# Patient Record
Sex: FEMALE | Race: WHITE | NOT HISPANIC OR LATINO | Employment: FULL TIME | URBAN - METROPOLITAN AREA
[De-identification: names, ages, dates, MRNs, and addresses within clinical notes are randomized per-mention and may not be internally consistent; named-entity substitution may affect disease eponyms.]

---

## 2019-04-10 LAB — RAPID GROUP A STREP (OHS): NEGATIVE

## 2020-01-21 ENCOUNTER — HISTORICAL (OUTPATIENT)
Dept: ADMINISTRATIVE | Facility: HOSPITAL | Age: 30
End: 2020-01-21

## 2020-01-21 LAB
ABS NEUT (OLG): 5.2 X10(3)/MCL (ref 2.1–9.2)
ALBUMIN SERPL-MCNC: 4.4 GM/DL (ref 3.4–5)
ALBUMIN/GLOB SERPL: 1.83 {RATIO} (ref 1.5–2.5)
ALP SERPL-CCNC: 60 UNIT/L (ref 38–126)
ALT SERPL-CCNC: 10 UNIT/L (ref 7–52)
APPEARANCE, UA: CLEAR
AST SERPL-CCNC: 14 UNIT/L (ref 15–37)
BACTERIA #/AREA URNS AUTO: NORMAL /HPF
BILIRUB SERPL-MCNC: 0.4 MG/DL (ref 0.2–1)
BILIRUB UR QL STRIP: NEGATIVE MG/DL
BILIRUBIN DIRECT+TOT PNL SERPL-MCNC: 0.1 MG/DL (ref 0–0.5)
BILIRUBIN DIRECT+TOT PNL SERPL-MCNC: 0.3 MG/DL
BUN SERPL-MCNC: 21 MG/DL (ref 7–18)
CALCIUM SERPL-MCNC: 9 MG/DL (ref 8.5–10)
CHLORIDE SERPL-SCNC: 100 MMOL/L (ref 98–107)
CO2 SERPL-SCNC: 29 MMOL/L (ref 21–32)
COLOR UR: NORMAL
CREAT SERPL-MCNC: 0.9 MG/DL (ref 0.6–1.3)
ERYTHROCYTE [DISTWIDTH] IN BLOOD BY AUTOMATED COUNT: 11.3 % (ref 11.5–17)
GLOBULIN SER-MCNC: 2.4 GM/DL (ref 1.2–3)
GLUCOSE (UA): NEGATIVE MG/DL
GLUCOSE SERPL-MCNC: 98 MG/DL (ref 74–106)
HCT VFR BLD AUTO: 39.6 % (ref 37–47)
HGB BLD-MCNC: 13.8 GM/DL (ref 12–16)
HGB UR QL STRIP: NEGATIVE UNIT/L
KETONES UR QL STRIP: NEGATIVE MG/DL
LEUKOCYTE ESTERASE UR QL STRIP: NEGATIVE UNIT/L
LYMPHOCYTES # BLD AUTO: 2.3 X10(3)/MCL (ref 0.6–3.4)
LYMPHOCYTES NFR BLD AUTO: 28.3 % (ref 13–40)
MCH RBC QN AUTO: 31.4 PG (ref 27–31.2)
MCHC RBC AUTO-ENTMCNC: 35 GM/DL (ref 32–36)
MCV RBC AUTO: 90 FL (ref 80–94)
MONOCYTES # BLD AUTO: 0.8 X10(3)/MCL (ref 0.1–1.3)
MONOCYTES NFR BLD AUTO: 9.3 % (ref 0.1–24)
NEUTROPHILS NFR BLD AUTO: 62.4 % (ref 47–80)
NITRITE UR QL STRIP.AUTO: NEGATIVE
PH UR STRIP: 7 [PH]
PLATELET # BLD AUTO: 218 X10(3)/MCL (ref 130–400)
PMV BLD AUTO: 9 FL (ref 9.4–12.4)
POTASSIUM SERPL-SCNC: 4.3 MMOL/L (ref 3.5–5.1)
PROT SERPL-MCNC: 6.8 GM/DL (ref 6.4–8.2)
PROT UR QL STRIP: NEGATIVE MG/DL
RBC # BLD AUTO: 4.39 X10(6)/MCL (ref 4.2–5.4)
RBC #/AREA URNS HPF: NORMAL /HPF
SODIUM SERPL-SCNC: 135 MMOL/L (ref 136–145)
SP GR UR STRIP: 1.02
SQUAMOUS EPITHELIAL, UA: NORMAL /LPF
TSH SERPL-ACNC: 0.66 MIU/ML (ref 0.35–4.94)
UROBILINOGEN UR STRIP-ACNC: 0.2 MG/DL
WBC # SPEC AUTO: 8.3 X10(3)/MCL (ref 4.5–11.5)
WBC #/AREA URNS AUTO: NORMAL /[HPF]

## 2020-03-19 ENCOUNTER — HISTORICAL (OUTPATIENT)
Dept: ADMINISTRATIVE | Facility: HOSPITAL | Age: 30
End: 2020-03-19

## 2020-03-19 ENCOUNTER — HISTORICAL (OUTPATIENT)
Dept: LAB | Facility: HOSPITAL | Age: 30
End: 2020-03-19

## 2020-03-19 LAB
APTT PPP: 28 SECOND(S) (ref 23.2–33.7)
CHOLEST SERPL-MCNC: 205 MG/DL (ref 0–200)
CHOLEST/HDLC SERPL: 3.5 {RATIO}
DEPRECATED CALCIDIOL+CALCIFEROL SERPL-MC: 25 NG/ML (ref 30–80)
HDLC SERPL-MCNC: 58 MG/DL (ref 35–60)
INR PPP: 1.1 (ref 0–1.3)
LDLC SERPL CALC-MCNC: 140 MG/DL (ref 0–129)
PROTHROMBIN TIME: 13.7 SECOND(S) (ref 11.1–13.7)
TRIGL SERPL-MCNC: 84 MG/DL (ref 30–150)
VLDLC SERPL CALC-MCNC: 16.8 MG/DL

## 2020-05-14 ENCOUNTER — HISTORICAL (OUTPATIENT)
Dept: LAB | Facility: HOSPITAL | Age: 30
End: 2020-05-14

## 2020-08-18 ENCOUNTER — HISTORICAL (OUTPATIENT)
Dept: ADMINISTRATIVE | Facility: HOSPITAL | Age: 30
End: 2020-08-18

## 2020-08-18 LAB
ABS NEUT (OLG): 4.6 X10(3)/MCL (ref 2.1–9.2)
ALBUMIN SERPL-MCNC: 4.4 GM/DL (ref 3.4–5)
ALBUMIN/GLOB SERPL: 1.91 {RATIO} (ref 1.5–2.5)
ALP SERPL-CCNC: 75 UNIT/L (ref 38–126)
ALT SERPL-CCNC: 8 UNIT/L (ref 7–52)
AST SERPL-CCNC: 13 UNIT/L (ref 15–37)
BILIRUB SERPL-MCNC: 0.2 MG/DL (ref 0.2–1)
BILIRUBIN DIRECT+TOT PNL SERPL-MCNC: 0.1 MG/DL
BILIRUBIN DIRECT+TOT PNL SERPL-MCNC: 0.1 MG/DL (ref 0–0.5)
BUN SERPL-MCNC: 15 MG/DL (ref 7–18)
CALCIUM SERPL-MCNC: 8.9 MG/DL (ref 8.5–10)
CHLORIDE SERPL-SCNC: 102 MMOL/L (ref 98–107)
CO2 SERPL-SCNC: 28 MMOL/L (ref 21–32)
CREAT SERPL-MCNC: 0.81 MG/DL (ref 0.6–1.3)
ERYTHROCYTE [DISTWIDTH] IN BLOOD BY AUTOMATED COUNT: 11.4 % (ref 11.5–17)
GLOBULIN SER-MCNC: 2.3 GM/DL (ref 1.2–3)
GLUCOSE SERPL-MCNC: 77 MG/DL (ref 74–106)
HCT VFR BLD AUTO: 38 % (ref 37–47)
HGB BLD-MCNC: 12.9 GM/DL (ref 12–16)
LYMPHOCYTES # BLD AUTO: 2.8 X10(3)/MCL (ref 0.6–3.4)
LYMPHOCYTES NFR BLD AUTO: 36.9 % (ref 13–40)
MCH RBC QN AUTO: 31.4 PG (ref 27–31.2)
MCHC RBC AUTO-ENTMCNC: 34 GM/DL (ref 32–36)
MCV RBC AUTO: 92 FL (ref 80–94)
MONOCYTES # BLD AUTO: 0.3 X10(3)/MCL (ref 0.1–1.3)
MONOCYTES NFR BLD AUTO: 4.4 % (ref 0.1–24)
NEUTROPHILS NFR BLD AUTO: 58.7 % (ref 47–80)
PLATELET # BLD AUTO: 240 X10(3)/MCL (ref 130–400)
PMV BLD AUTO: 8.5 FL (ref 9.4–12.4)
POTASSIUM SERPL-SCNC: 4.1 MMOL/L (ref 3.5–5.1)
PROT SERPL-MCNC: 6.7 GM/DL (ref 6.4–8.2)
RBC # BLD AUTO: 4.11 X10(6)/MCL (ref 4.2–5.4)
SODIUM SERPL-SCNC: 137 MMOL/L (ref 136–145)
TSH SERPL-ACNC: 0.73 MIU/ML (ref 0.35–4.94)
WBC # SPEC AUTO: 7.7 X10(3)/MCL (ref 4.5–11.5)

## 2020-11-02 ENCOUNTER — HISTORICAL (OUTPATIENT)
Dept: LAB | Facility: HOSPITAL | Age: 30
End: 2020-11-02

## 2020-11-27 LAB — RAPID GROUP A STREP (OHS): NEGATIVE

## 2021-05-06 ENCOUNTER — HISTORICAL (OUTPATIENT)
Dept: ADMINISTRATIVE | Facility: HOSPITAL | Age: 31
End: 2021-05-06

## 2021-05-06 LAB
ABS NEUT (OLG): 4.3 X10(3)/MCL (ref 2.1–9.2)
ALBUMIN SERPL-MCNC: 4.3 GM/DL (ref 3.4–5)
ALBUMIN/GLOB SERPL: 1.65 {RATIO} (ref 1.5–2.5)
ALP SERPL-CCNC: 63 UNIT/L (ref 38–126)
ALT SERPL-CCNC: 12 UNIT/L (ref 7–52)
AST SERPL-CCNC: 14 UNIT/L (ref 15–37)
BILIRUB SERPL-MCNC: 0.3 MG/DL (ref 0.2–1)
BILIRUBIN DIRECT+TOT PNL SERPL-MCNC: 0.1 MG/DL (ref 0–0.5)
BILIRUBIN DIRECT+TOT PNL SERPL-MCNC: 0.2 MG/DL
BUN SERPL-MCNC: 12 MG/DL (ref 7–18)
CALCIUM SERPL-MCNC: 8.6 MG/DL (ref 8.5–10)
CHLORIDE SERPL-SCNC: 102 MMOL/L (ref 98–107)
CHOLEST SERPL-MCNC: 241 MG/DL (ref 0–200)
CHOLEST/HDLC SERPL: 4.2 {RATIO}
CO2 SERPL-SCNC: 27 MMOL/L (ref 21–32)
CREAT SERPL-MCNC: 0.69 MG/DL (ref 0.6–1.3)
ERYTHROCYTE [DISTWIDTH] IN BLOOD BY AUTOMATED COUNT: 11.4 % (ref 11.5–17)
EST CREAT CLEARANCE SER (OHS): 179.75 ML/MIN
GLOBULIN SER-MCNC: 2.6 GM/DL (ref 1.2–3)
GLUCOSE SERPL-MCNC: 86 MG/DL (ref 74–106)
HCT VFR BLD AUTO: 36.8 % (ref 37–47)
HDLC SERPL-MCNC: 58 MG/DL (ref 35–60)
HGB BLD-MCNC: 13 GM/DL (ref 12–16)
LDLC SERPL CALC-MCNC: 164 MG/DL (ref 0–129)
LYMPHOCYTES # BLD AUTO: 2.9 X10(3)/MCL (ref 0.6–3.4)
LYMPHOCYTES NFR BLD AUTO: 38.7 % (ref 13–40)
MCH RBC QN AUTO: 32.9 PG (ref 27–31.2)
MCHC RBC AUTO-ENTMCNC: 35 GM/DL (ref 32–36)
MCV RBC AUTO: 93 FL (ref 80–94)
MONOCYTES # BLD AUTO: 0.3 X10(3)/MCL (ref 0.1–1.3)
MONOCYTES NFR BLD AUTO: 3.8 % (ref 0.1–24)
NEUTROPHILS NFR BLD AUTO: 57.5 % (ref 47–80)
PLATELET # BLD AUTO: 262 X10(3)/MCL (ref 130–400)
PMV BLD AUTO: 8.5 FL (ref 9.4–12.4)
POTASSIUM SERPL-SCNC: 4.1 MMOL/L (ref 3.5–5.1)
PROT SERPL-MCNC: 6.9 GM/DL (ref 6.4–8.2)
RBC # BLD AUTO: 3.95 X10(6)/MCL (ref 4.2–5.4)
SODIUM SERPL-SCNC: 137 MMOL/L (ref 136–145)
TRIGL SERPL-MCNC: 154 MG/DL (ref 30–150)
TSH SERPL-ACNC: 0.85 MIU/ML (ref 0.35–4.94)
VLDLC SERPL CALC-MCNC: 30.8 MG/DL
WBC # SPEC AUTO: 7.5 X10(3)/MCL (ref 4.5–11.5)

## 2022-01-06 ENCOUNTER — HISTORICAL (OUTPATIENT)
Dept: LAB | Facility: HOSPITAL | Age: 32
End: 2022-01-06

## 2022-01-06 LAB — SARS-COV-2 AG RESP QL IA.RAPID: NEGATIVE

## 2022-01-07 ENCOUNTER — HISTORICAL (OUTPATIENT)
Dept: LAB | Facility: HOSPITAL | Age: 32
End: 2022-01-07

## 2022-01-07 LAB — SARS-COV-2 AG RESP QL IA.RAPID: POSITIVE

## 2022-02-03 ENCOUNTER — HISTORICAL (OUTPATIENT)
Dept: LAB | Facility: HOSPITAL | Age: 32
End: 2022-02-03

## 2022-02-03 LAB
FLUAV AG UPPER RESP QL IA.RAPID: NEGATIVE
FLUBV AG UPPER RESP QL IA.RAPID: NEGATIVE
SARS-COV-2 RNA RESP QL NAA+PROBE: POSITIVE

## 2022-04-09 ENCOUNTER — HISTORICAL (OUTPATIENT)
Dept: ADMINISTRATIVE | Facility: HOSPITAL | Age: 32
End: 2022-04-09

## 2022-04-29 VITALS
HEIGHT: 65 IN | SYSTOLIC BLOOD PRESSURE: 112 MMHG | OXYGEN SATURATION: 99 % | BODY MASS INDEX: 34.46 KG/M2 | HEIGHT: 65 IN | DIASTOLIC BLOOD PRESSURE: 76 MMHG | DIASTOLIC BLOOD PRESSURE: 82 MMHG | WEIGHT: 206.81 LBS | SYSTOLIC BLOOD PRESSURE: 118 MMHG | OXYGEN SATURATION: 96 % | BODY MASS INDEX: 31.51 KG/M2 | WEIGHT: 189.13 LBS

## 2022-05-02 NOTE — HISTORICAL [facility] CERNER
This is a historical note converted from Dionisio. Formatting and pictures may have been removed.  Please reference Dionisio for original formatting and attached multimedia. Chief Complaint  NP FATIGUE, COLD, ANXIETY  NOW HAS SORE THROAT, HEADACHE, CONGESTION  History of Present Illness  Fatigue over the last month.? Associated with cold sensitivity.  Hx of mild anxiety since childhood.? 6 years ago pts grandfather  which triggered significant increase in anxiety.? Pt reports chronic overwhelming anxiety since.? Frequent panic attacks.??Affecting quality of life, marriage,?parenting. ?Patient previously?treated with fluoxetine?and BuSpar as needed which was not overly effective.  2-day history of sore throat, nasal congestion, rhinorrhea?and postnasal drip. ?Objective fever?today.  Review of Systems  Constitutional:?No weight loss, no fever, fatigue, no chills, no night sweats,?no weakness  Eyes:?No blurred vision,?no redness,?no drainage,?no ocular?pain  HEENT:?sore throat,?no ear pain, no sinus pressure, nasal congestion, rhinorrhea, postnasal drip  Respiratory:?No cough, no wheezing, no sputum production, no shortness of breath  Cardiovascular:?No chest pain, no palpitations, no dyspnea on exertion,?no orthopnea  Gastrointestinal:?No nausea, no vomiting, no abdominal pain, no diarrhea,?no constipation, no melena,?no hematochezia  Genitourinary:?No dysuria, no hematuria, no frequency, no urgency, no incontinence, no discharge  Musculoskeletal:?No myalgias, no arthralgias, no weakness, no joint effusion, no edema  Integumentary:?No rashes, no hives, no itching, no lesions, no jaundice  Neurologic:?No headaches, no numbness, no tingling, no weakness, no dizziness  Psychiatric:?anxiety, no irritability, no depression,?no suicidal ideations, no?homicidal ideations,?no delusions, no hallucinations  Endocrine:?No polyuria, no polydipsia, no polyphagia  Hematology:?No bruising, no lymphadenopathy,?no  paleness  ?  Physical Exam  Vitals & Measurements  T:?37.1? ?C (Oral)? HR:?90(Peripheral)? BP:?100/72?  HT:?165?cm? WT:?77.6?kg? BMI:?28.5?  General:?Well developed, Well-nourished, in No acute distress, A&O x 4  Eye:?PERRLA, EOMI, Clear conjunctiva, Eyelids unremarkable  Ears:?Bilateral EAC clear?and?Bilateral TM clear  Nose:? Mucosa edematous, rhinorrhea, No lesions  O/P:??erythema,?No tonsillar hypertrophy,?No tonsillar exudates,?cobblestoning  Neck:?Soft, Nontender, No thyromegaly, Full range of motion, No cervical lymphadenopathy, No JVD  Cardiovascular:?Regular rate and rhythm, No murmurs, No gallops, No rubs  Respiratory:?Clear to auscultation bilaterally, No wheezes, No rhonchi, No?crackles  Abdomen:? Soft, Nontender, No hepatosplenomegaly, Normal and equal bowel sounds, No masses, No rebound, No guarding  Musculoskeletal:? No tenderness, FROM, No joint abnormality, No clubbing, No cyanosis,No?edema  Neurologic:? No motor or sensory deficits, Reflexes 2+ throughout, CN II-XII grossly intact  Integumentary:?No rashes or skin lesions  Psychiatric:?Good insight,?appropriate thought process, normal affect,?appropriate?speech,  non-suicidal, cooperative, appropriate judgment  Assessment/Plan  1.?Anxiety?F41.9  Start Lexapro 5 mg daily  Coping skills, stress management and treatment options discussed at length  Ordered:  Clinic Follow up, *Est. 02/21/20 3:00:00 CST, Order for future visit, Anxiety  Fatigue  Cold sensitivity  Acute URI, [facility]  Office/Outpatient Visit Level 3 New [id number] PC, Anxiety  Fatigue  Cold sensitivity  Acute URI, [facility], 01/21/20 16:08:00 CST  ?  2.?Fatigue?R53.83  Ordered:  CBC w/ Auto Diff, Routine collect, 01/21/20 16:07:00 CST, Blood, Order for future visit, Stop date 01/21/20 16:07:00 CST, Lab Collect, Fatigue, 01/21/20 16:07:00 CST  Clinic Follow up, *Est. 02/21/20 3:00:00 CST, Order for future visit, Anxiety  Fatigue  Cold sensitivity  Acute URI, [facility]  Comprehensive Metabolic Panel, Routine collect, 01/21/20 16:07:00 CST, Blood, Order for future visit, Stop date 01/21/20 16:07:00 CST, Lab Collect, Fatigue, 01/21/20 16:07:00 CST  Office/Outpatient Visit Level 3 New [id number] PC, Anxiety  Fatigue  Cold sensitivity  Acute URI, [facility], 01/21/20 16:08:00 CST  Thyroid Stimulating Hormone, Routine collect, 01/21/20 16:07:00 CST, Blood, Order for future visit, Stop date 01/21/20 16:07:00 CST, Lab Collect, Fatigue, 01/21/20 16:07:00 CST  Urinalysis Complete no reflex, Routine collect, Urine, Order for future visit, 01/21/20 16:08:00 CST, Stop date 01/21/20 16:08:00 CST, Nurse collect, Fatigue  ?  3.?Cold sensitivity?R68.89  ?CBC, CMP, TSH today  Ordered:  Clinic Follow up, *Est. 02/21/20 3:00:00 CST, Order for future visit, Anxiety  Fatigue  Cold sensitivity  Acute URI, [facility]  Office/Outpatient Visit Level 3 New [id number] PC, Anxiety  Fatigue  Cold sensitivity  Acute URI, [facility], 01/21/20 16:08:00 CST  ?  4.?Acute URI?J06.9  ?Symptomatic treatment recommended  If symptoms fail to improve patient will contact my office  Ordered:  Clinic Follow up, *Est. 02/21/20 3:00:00 CST, Order for future visit, Anxiety  Fatigue  Cold sensitivity  Acute URI, [facility]  Office/Outpatient Visit Level 3 New [id number] PC, Anxiety  Fatigue  Cold sensitivity  Acute URI, [facility], 01/21/20 16:08:00 CST  ?  Orders:  Lab Collection Request, 01/21/20 16:07:00 CST, [facility], 01/21/20 16:07:00 CST  Referrals  Clinic Follow up, *Est. 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 Exercise type: Yoga., 01/21/2020  Tobacco  Never (less than 100 in lifetime), N/A, 01/21/2020  Never (less than 100 in lifetime), N/A, 04/10/2019  Family History  Hypertension: Father.  Thyroid disorder: Mother.  Immunizations  Vaccine Date Status   influenza virus vaccine, inactivated 11/15/2017 Recorded   Health Maintenance  Health Maintenance  ???Pending?(in the next year)  ??? ??Due?  ??? ? ? ?Alcohol Misuse Screening due??01/01/20??and every 1??year(s)  ??? ? ? ?ADL Screening due??01/21/20??and every 1??year(s)  ??? ? ? ?Cervical Cancer Screening due??01/21/20??and every?  ??? ? ? ?Depression Screening due??01/21/20??and every?  ??? ? ? ?Tetanus Vaccine due??01/21/20??and every 10??year(s)  ??? ??Due In Future?  ??? ? ? ?TB Skin Test not due until??03/11/20??and every 1??year(s)  ??? ? ? ?Obesity Screening not due until??01/01/21??and every 1??year(s)  ??? ? ? ?Blood Pressure Screening not due until??01/20/21??and every 1??year(s)  ??? ? ? ?Body Mass Index Check not due until??01/20/21??and every 1??year(s)  ???Satisfied?(in the past 1 year)  ??? ??Satisfied?  ??? ? ? ?Blood Pressure Screening on??01/21/20.??Satisfied by Maite Hart LPN  ??? ? ? ?Body Mass Index Check on??01/21/20.??Satisfied by Maite Hart LPN  ??? ? ? ?Obesity Screening on??01/21/20.??Satisfied by Maite Hart LPN  ??? ? ? ?TB Skin Test on??03/11/19.  ?

## 2022-05-02 NOTE — HISTORICAL [facility] CERNER
This is a historical note converted from Dionisio. Formatting and pictures may have been removed.  Please reference Dionisio for original formatting and attached multimedia. Chief Complaint  CPX FASTING  History of Present Illness  30?year old female presents for wellness visit.  Blood Pressure - 112/82  BMI - 34.45  Immunizations -?Up to date  Breast Cancer Screening - up to date, Sees Dr. Finn Mclean  Cervical Cancer Screening -?up to date, Sees Dr. Finn Mclean  Diet - Average  Exercise - walking daily and yoga 3 times weekly  Anxiety?increased?although?mostly manageable. ?Tolerating Lexapro well.? Patient has?gone through?a separation and pending divorce over the last year.  Significant weight gain?greater than 30 pounds over the last?6 months.? Patient denies any significant?diet or lifestyle changes.  Review of Systems  Constitutional:?No weight loss, no fever, no fatigue, no chills, no night sweats,?no weakness  Eyes:?No blurred vision,?no redness,?no drainage,?no ocular?pain  HEENT:?No sore throat,?no ear pain, no sinus pressure, no nasal congestion, no rhinorrhea, no postnasal drip  Respiratory:?No cough, no wheezing, no sputum production, no shortness of breath  Cardiovascular:?No chest pain, no palpitations, no dyspnea on exertion,?no orthopnea  Gastrointestinal:?No nausea, no vomiting, no abdominal pain, no diarrhea,?no constipation, no melena,?no hematochezia  Genitourinary:?No dysuria, no hematuria, no frequency, no urgency, no incontinence, no discharge  Musculoskeletal:?No myalgias, no arthralgias, no weakness, no joint effusion, no edema  Integumentary:?No rashes, no hives, no itching, no lesions, no jaundice  Neurologic:?No headaches, no numbness, no tingling, no weakness, no dizziness  Psychiatric:?anxiety, no irritability, no depression,?no suicidal ideations, no?homicidal ideations,?no delusions, no hallucinations  Endocrine:?No polyuria, no polydipsia, no polyphagia  Hematology:?No  bruising, no lymphadenopathy,?no paleness  Physical Exam  Vitals & Measurements  HR:?78(Peripheral)? BP:?112/82? SpO2:?99%?  HT:?165.00?cm? WT:?93.800?kg? BMI:?34.45?  General:?Well developed, Well-nourished, in No acute distress, A&O x 4  Eye:?PERRLA, EOMI, Clear conjunctiva, Eyelids unremarkable  Ears:?Bilateral EAC clear?and?Bilateral TM clear  Nose:? Mucosa normal, No rhinorrhea, No lesions  O/P:??No?erythema,?No tonsillar hypertrophy,?No tonsillar exudates,?No cobblestoning  Neck:?Soft, Nontender, No thyromegaly, Full range of motion, No cervical lymphadenopathy, No JVD  Cardiovascular:?Regular rate and rhythm, No murmurs, No gallops, No rubs  Respiratory:?Clear to auscultation bilaterally, No wheezes, No rhonchi, No?crackles  Abdomen:? Soft, Nontender, No hepatosplenomegaly, Normal and equal bowel sounds, No masses, No rebound, No guarding  Musculoskeletal:? No tenderness, FROM, No joint abnormality, No clubbing, No cyanosis,No?edema  Neurologic:? No motor or sensory deficits, Reflexes 2+ throughout, CN II-XII grossly intact  Integumentary:?No rashes or skin lesions  Assessment/Plan  1.?Wellness examination?Z00.00  ?Discussed the?importance of maintaining a balanced diet and regular exercise  CBC, CMP, FLP, TSH today  Ordered:  Clinic Follow up, *Est. 06/09/21 9:45:00 CDT, Order for future visit, Wellness examination, ink [facility]  Comprehensive Metabolic Panel, Routine collect, 05/06/21 11:26:00 CDT, Blood, Stop date 05/06/21 11:26:00 CDT, Lab Collect, Wellness examination, 05/06/21 11:26:00 CDT  Lipid Panel, Routine collect, 05/06/21 11:26:00 CDT, Blood, Stop date 05/06/21 11:26:00 CDT, Lab Collect, Wellness examination, 05/06/21 11:26:00 CDT  Preventative Health Care Est 18-39 years [id number] PC, Wellness examination, [facility], 05/06/21 11:12:00 CDT  Thyroid Stimulating Hormone, Routine collect, 05/06/21 11:26:00 CDT, Blood, Stop date 05/06/21 11:26:00 CDT, Lab Collect, Wellness examination, 05/06/21  11:26:00 CDT  ?  2.?Anxiety?F41.9  ?Increase Lexapro to 20 mg daily  Ordered:  Clinic Follow up, *Est. 06/09/21 9:45:00 CDT, Order for future visit, Wellness examination, [facility]  ?  3.?Obesity?E66.9  ?Following review of labs will consider?options to?assist patient with weight loss  ?  Orders:  escitalopram, 20 mg = 1 tab(s), Oral, Daily, # 30 tab(s), 5 Refill(s), Pharmacy: [facility]/pharmacy #[id number], 165, cm, Height/Length Dosing, 05/06/21 10:39:00 CDT, 93.8, kg, Weight Dosing, 05/06/21 10:39:00 CDT  Referrals  Clinic Follow up, *Est. 06/09/21 9:45:00 CDT, Order for future visit, Wellness examination, [facility]   Problem List/Past Medical History  Ongoing  Anxiety  Obesity  Historical  No qualifying data  Procedure/Surgical History  right knee repair x 3 (2008)   Medications  APPLE CIDAR VINEGAR GUMMIE  escitalopram 20 mg oral tablet, 20 mg= 1 tab(s), Oral, Daily, 5 refills  ethinyl estradiol-norgestimate triphasic 35 mcg oral tablet, 1 tab(s), Oral, Daily  Allergies  codeine?(Intolerance)  Social History  Abuse/Neglect  No, 05/06/2021  Alcohol  Current, Beer, Wine, 1-2 times per week, Alcohol use interferes with work or home: No. Others hurt by drinking: No. 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 Exercise type: Yoga., 01/21/2020  Tobacco  Never (less than 100 in lifetime), No, 05/06/2021  Family History  Hypertension: Father.  Hypothyroidism.: Mother.  Ovarian cancer: Maternal Grandmother.  Thyroid cancer: Paternal Grandmother.  Brother: History is negative  Sister: History is negative  Immunizations  Vaccine Date Status   influenza virus vaccine, inactivated 2019 Recorded   influenza virus vaccine, inactivated 11/15/2017 Recorded   tetanus/diphtheria/pertussis, acel(Tdap) 2017 Recorded   Health Maintenance  Health Maintenance  ???Pending?(in the next year)  ??? ??OverDue  ??? ? ? ?TB Skin  Test due??03/11/20??and every 1??year(s)  ??? ? ? ?Influenza Vaccine due??10/01/20??and every 1??day(s)  ??? ? ? ?Alcohol Misuse Screening due??01/02/21??and every 1??year(s)  ??? ??Due?  ??? ? ? ?ADL Screening due??05/06/21??and every 1??year(s)  ??? ? ? ?Depression Screening due??05/06/21??Unknown Frequency  ??? ??Due In Future?  ??? ? ? ?Obesity Screening not due until??01/01/22??and every 1??year(s)  ???Satisfied?(in the past 1 year)  ??? ??Satisfied?  ??? ? ? ?Blood Pressure Screening on??05/06/21.??Satisfied by Maite Hart LPN  ??? ? ? ?Body Mass Index Check on??05/06/21.??Satisfied by Maite Hart LPN  ??? ? ? ?Cervical Cancer Screening on??01/04/21.??Satisfied by Arian Altamirano  ??? ? ? ?Obesity Screening on??05/06/21.??Satisfied by Maite Hart LPN  ?

## 2022-05-02 NOTE — HISTORICAL [facility] CERNER
This is a historical note converted from Cercarlos. Formatting and pictures may have been removed.  Please reference Dionisio for original formatting and attached multimedia. Chief Complaint  DIZZINESS, ROOM SPINNING. EARS FEEL FULL  History of Present Illness  Thursday last week patient noted?positional vertigo?which slowly increased since.? Yesterday noted?shortness of breath which felt like chest?tightness. ?Denies any cough,?wheezing, fever, chills.? At times she feels off balance or?as if the room is spinning. ?Symptoms worsen with positional changes.? Both ears feel?congested. ?Denies any decreased hearing.? Denies similar symptoms in the past.  Denies any recent sick contacts.? Very minimal nasal congestion.  Review of Systems  Constitutional:?No weight loss, no fever, no fatigue, no chills, no night sweats,?no weakness  Eyes:?No blurred vision,?no redness,?no drainage,?no ocular?pain  HEENT:?No sore throat,?no ear pain, no sinus pressure, nasal congestion, no rhinorrhea, no postnasal drip  Respiratory:?No cough, no wheezing, no sputum production, shortness of breath  Cardiovascular:?No chest pain, no palpitations, no dyspnea on exertion,?no orthopnea  Gastrointestinal:?No nausea, no vomiting, no abdominal pain, no diarrhea,?no constipation, no melena,?no hematochezia  Genitourinary:?No dysuria, no hematuria, no frequency, no urgency, no incontinence, no discharge  Musculoskeletal:?No myalgias, no arthralgias, no weakness, no joint effusion, no edema  Integumentary:?No rashes, no hives, no itching, no lesions, no jaundice  Neurologic:?No headaches, no numbness, no tingling, no weakness, dizziness  Psychiatric:?No anxiety, no irritability, no depression,?no suicidal ideations, no?homicidal ideations,?no delusions, no hallucinations  Endocrine:?No polyuria, no polydipsia, no polyphagia  Hematology:?No bruising, no lymphadenopathy,?no paleness  ?  Physical Exam  Vitals & Measurements  HR:?80(Peripheral)? BP:?118/76?  SpO2:?96%?  HT:?165.00?cm? WT:?85.800?kg? BMI:?31.52?  General:?Well developed, Well-nourished, in No acute distress, A&O x 4  Eye:?PERRLA, EOMI, Clear conjunctiva, Eyelids unremarkable  Ears:?Bilateral EAC clear?and?Bilateral TM clear  Nose:? Mucosa normal, No rhinorrhea, No lesions  O/P:??No?erythema,?No tonsillar hypertrophy,?No tonsillar exudates,?No cobblestoning  Neck:?Soft, Nontender, No thyromegaly, Full range of motion, No cervical lymphadenopathy, No JVD  Cardiovascular:?Regular rate and rhythm, No murmurs, No gallops, No rubs  Respiratory:?Clear to auscultation bilaterally, No wheezes, No rhonchi, No?crackles  Abdomen:? Soft, Nontender, No hepatosplenomegaly, Normal and equal bowel sounds, No masses, No rebound, No guarding  Musculoskeletal:? No tenderness, FROM, No joint abnormality, No clubbing, No cyanosis,No?edema  Neurologic:? No motor or sensory deficits, Reflexes 2+ throughout, CN II-XII grossly intact,?mildly positive Kinjal-Hallpike mostly toward the right  Integumentary:?No rashes or skin lesions  ?  Assessment/Plan  1.?SOB (shortness of breath)?R06.02  ?EKG-normal sinus rhythm without any ST or T wave changes  Chest x-ray-no consolidation?noted  ER precautions given to patient if?shortness of breath progressively worsens?or new symptoms develop overnight  Ordered:  Comprehensive Metabolic Panel, Routine collect, 08/18/20 16:39:00 CDT, Blood, Stop date 08/18/20 16:39:00 CDT, Lab Collect, SOB (shortness of breath), 08/18/20 16:39:00 CDT  Office/Outpatient Visit Level 4 Established [id number] PC, SOB (shortness of breath)  Vestibular neuronitis, [facility] - [facility], 08/18/20 16:19:00 CDT  Thyroid Stimulating Hormone, Routine collect, 08/18/20 16:39:00 CDT, Blood, Stop date 08/18/20 16:39:00 CDT, Lab Collect, SOB (shortness of breath), 08/18/20 16:39:00 CDT  XR Chest 2 Views, Routine, 08/18/20 16:19:00 CDT, Other (please specify), None, Ambulatory, Rad Type, SOB (shortness of breath), [facility]  Physicians, 08/18/20 16:19:00 CDT  ?  2.?Vestibular neuronitis?H81.20  ?  ?Celestone 12 mg IM today  Meclizine 25 mg 3 times daily  CBC, CMP, TSH today  Patient to call with update in the morning  Ordered:  Office/Outpatient Visit Level 4 Established [id number] PC, SOB (shortness of breath)  Vestibular neuronitis, [facility] - [facility], 08/18/20 16:19:00 CDT  ?  Orders:  meclizine, 25 mg = 1 tab(s), Oral, TID, PRN PRN for dizziness, X 10 day(s), # 30 tab(s), 0 Refill(s), Pharmacy: [facility]/pharmacy #[id number], 165, cm, Height/Length Dosing, 08/18/20 15:10:00 CDT, 85.8, kg, Weight Dosing, 08/18/20 15:10:00 CDT   Problem List/Past Medical History  Ongoing  Anxiety  Obesity  Historical  No qualifying data  Procedure/Surgical History  right knee repair x 3 (2008)   Medications  APPLE CIDAR VINEGAR GUMMIE  meclizine 25 mg oral tablet, 25 mg= 1 tab(s), Oral, TID, PRN  sertraline 50 mg oral tablet, See Instructions  Allergies  codeine  Social History  Abuse/Neglect  No, 08/18/2020  No, 08/17/2020  Alcohol  Current, Beer, Wine, 1-2 times per week, Alcohol use interferes with work or home: No. Others hurt by drinking: No. 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 Exercise type: Yoga., 01/21/2020  Tobacco  Never (less than 100 in lifetime), No, 08/18/2020  Never (less than 100 in lifetime), N/A, 03/19/2020  Family History  Hypertension: Father.  Hypothyroidism.: Mother.  Ovarian cancer: Maternal Grandmother.  Thyroid cancer: Paternal Grandmother.  Brother: History is negative  Sister: History is negative  Immunizations  Vaccine Date Status   influenza virus vaccine, inactivated 2019 Recorded   influenza virus vaccine, inactivated 11/15/2017 Recorded   tetanus/diphtheria/pertussis, acel(Tdap) 2017 Recorded   Health Maintenance  Health Maintenance  ???Pending?(in the next year)  ??? ??OverDue  ??? ? ? ?Alcohol Misuse  Screening due??01/02/20??and every 1??year(s)  ??? ? ? ?TB Skin Test due??03/11/20??and every 1??year(s)  ??? ??Due?  ??? ? ? ?ADL Screening due??08/18/20??and every 1??year(s)  ??? ? ? ?Cervical Cancer Screening due??08/18/20??and every?  ??? ? ? ?Depression Screening due??08/18/20??and every?  ??? ? ? ?Influenza Vaccine due??08/18/20??and every?  ??? ??Due In Future?  ??? ? ? ?Obesity Screening not due until??01/01/21??and every 1??year(s)  ???Satisfied?(in the past 1 year)  ??? ??Satisfied?  ??? ? ? ?Blood Pressure Screening on??08/18/20.??Satisfied by Maite Hart LPN  ??? ? ? ?Body Mass Index Check on??08/18/20.??Satisfied by Maite Hart LPN  ??? ? ? ?Obesity Screening on??08/18/20.??Satisfied by Maite Hart LPN  ?

## 2022-05-02 NOTE — HISTORICAL [facility] CERNER
This is a historical note converted from [employer]. Formatting and pictures may have been removed.  Please reference Dionisio for original formatting and attached multimedia. Chief Complaint  Follow up CPX FASTING  History of Present Illness  29?year old female presents for wellness visit.  Blood Pressure - 120/74  BMI - 28.58  Immunizations -?Tdap 2017, Influenza Vaccine received at work  Breast Cancer Screening - up to date, Sees Dr. Finn Mclean  Cervical Cancer Screening -?up to date, Sees Dr. Finn Mclean  Diet - Balanced  Exercise - yoga 5-6 days weekly  Frequent bruising mostly lower extremities.  Anxiety?improved with Lexapro although increased to 10 mg daily 1 month ago.? Patient feels she is managing her stress in a much healthier way overall.? She does report decreased libido since starting Lexapro.  Review of Systems  Constitutional:?No weight loss, no fever, no fatigue, no chills, no night sweats,?no weakness  Eyes:?No blurred vision,?no redness,?no drainage,?no ocular?pain  HEENT:?No sore throat,?no ear pain, no sinus pressure, no nasal congestion, no rhinorrhea, no postnasal drip  Respiratory:?No cough, no wheezing, no sputum production, no shortness of breath  Cardiovascular:?No chest pain, no palpitations, no dyspnea on exertion,?no orthopnea  Gastrointestinal:?No nausea, no vomiting, no abdominal pain, no diarrhea,?no constipation, no melena,?no hematochezia  Genitourinary:?No dysuria, no hematuria, no frequency, no urgency, no incontinence, no discharge  Musculoskeletal:?No myalgias, no arthralgias, no weakness, no joint effusion, no edema  Integumentary:?No rashes, no hives, no itching, no lesions, no jaundice  Neurologic:?No headaches, no numbness, no tingling, no weakness, no dizziness  Psychiatric:?anxiety, no irritability, no depression,?no suicidal ideations, no?homicidal ideations,?no delusions, no hallucinations  Endocrine:?No polyuria, no polydipsia, no polyphagia  Hematology:?No  bruising, no lymphadenopathy,?no paleness  ?  Physical Exam  Vitals & Measurements  HR:?60(Peripheral)? BP:?120/74?  HT:?165?cm? WT:?77.8?kg? BMI:?28.58?  General:?Well developed, Well-nourished, in No acute distress, A&O x 4  Eye:?PERRLA, EOMI, Clear conjunctiva, Eyelids unremarkable  Ears:?Bilateral EAC clear?and?Bilateral TM clear  Nose:? Mucosa normal, No rhinorrhea, No lesions  O/P:??No?erythema,?No tonsillar hypertrophy,?No tonsillar exudates,?No cobblestoning  Neck:?Soft, Nontender, No thyromegaly, Full range of motion, No cervical lymphadenopathy, No JVD  Cardiovascular:?Regular rate and rhythm, No murmurs, No gallops, No rubs  Respiratory:?Clear to auscultation bilaterally, No wheezes, No rhonchi, No?crackles  Abdomen:? Soft, Nontender, No hepatosplenomegaly, Normal and equal bowel sounds, No masses, No rebound, No guarding  Musculoskeletal:? No tenderness, FROM, No joint abnormality, No clubbing, No cyanosis,No?edema  Neurologic:? No motor or sensory deficits, Reflexes 2+ throughout, CN II-XII grossly intact  Integumentary:?No rashes or skin lesions  ?Psychiatric:?Good insight,?appropriate thought process, normal affect,?appropriate?speech,  non-suicidal, cooperative, appropriate judgment  Assessment/Plan  1.?Wellness examination?Z00.00  ?FLP today  Discussed the?importance of maintaining a balanced diet and regular exercise  Ordered:  [facility] Est 18-39 years [id number] PC, Easy bruising  Anxiety  Wellness examination, Code Fever AMB - AFP, 03/19/20 10:27:00 CDT  ?  2.?Anxiety?F41.9  ?Transition to Zoloft 50 mg daily  Continue efforts to improve coping skills and stress management  Ordered:  [facility] Est 18-39 years [id number] PC, Easy bruising  Anxiety  Wellness examination, Code Fever AMB - AFP, 03/19/20 10:27:00 CDT  ?  3.?Easy bruising?R23.8  ?PT, PTT  Ordered:  [facility] Est 18-39 years [id number] PC, Easy bruising  Anxiety  Wellness examination, [facility], 03/19/20  10:27:00 CDT  Vitamin D, 25-Hydroxy Level, Routine collect, 03/19/20 10:38:00 CDT, Blood, Stop date 03/19/20 10:38:00 CDT, Lab Collect, Easy bruising, 03/19/20 10:38:00 CDT  ?  Orders:  sertraline, 50 mg = 1 tab(s), Oral, Daily, # 30 tab(s), 2 Refill(s), Pharmacy: [facility]/pharm[facility] #[id number], 165, cm, Height/Length Dosing, 03/19/20 9:29:00 CDT, 77.8, kg, Weight Dosing, 03/19/20 9:29:00 CDT  Referrals  Clinic Follow up, Order for future visit   Problem List/Past Medical History  Ongoing  Anxiety  Obesity  Historical  No qualifying data  Procedure/Surgical History  right knee repair x 3 (2008)   Medications  APPLE CIDAR VINEGAR GUMMIE  B Complex Cap, Oral, Daily  Zoloft 50 mg oral tablet, 50 mg= 1 tab(s), Oral, Daily, 2 refills  Allergies  codeine  Social History  Abuse/Neglect  No, 03/19/2020  Alcohol  Current, Beer, Wine, 1-2 times per week, Alcohol use interferes with work or home: No. Others hurt by drinking: No. 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 Exercise type: Yoga., 01/21/2020  Tobacco  Never (less than 100 in lifetime), N/A, 03/19/2020  Family History  Hypertension: Father.  Hypothyroidism.: Mother.  Ovarian cancer: Maternal Grandmother.  Thyroid cancer: Paternal Grandmother.  Brother: History is negative  Sister: History is negative  Immunizations  Vaccine Date Status   influenza virus vaccine, inactivated 2019 Recorded   influenza virus vaccine, inactivated 11/15/2017 Recorded   tetanus/diphtheria/pertussis, acel(Tdap) 2017 Recorded   Health Maintenance  Health Maintenance  ???Pending?(in the next year)  ??? ??OverDue  ??? ? ? ?Alcohol Misuse Screening due??01/01/20??and every 1??year(s)  ??? ??Due?  ??? ? ? ?TB Skin Test due??03/11/20??and every 1??year(s)  ??? ? ? ?ADL Screening due??03/19/20??and every 1??year(s)  ??? ? ? ?Cervical Cancer Screening due??03/19/20??and every?  ??? ? ?  ?Depression Screening due??03/19/20??and every?  ??? ??Due In Future?  ??? ? ? ?Obesity Screening not due until??01/01/21??and every 1??year(s)  ???Satisfied?(in the past 1 year)  ??? ??Satisfied?  ??? ? ? ?Blood Pressure Screening on??03/19/20.??Satisfied by Maite Hart LPN  ??? ? ? ?Body Mass Index Check on??03/19/20.??Satisfied by Maite Hart LPN  ??? ? ? ?Obesity Screening on??03/19/20.??Satisfied by Maite Hart LPN  ?

## 2022-05-18 PROBLEM — E66.9 OBESITY: Status: ACTIVE | Noted: 2022-05-18

## 2022-05-18 PROBLEM — F41.1 GAD (GENERALIZED ANXIETY DISORDER): Status: ACTIVE | Noted: 2022-05-18

## 2022-05-18 PROBLEM — F90.2 ATTENTION DEFICIT HYPERACTIVITY DISORDER (ADHD), COMBINED TYPE: Status: ACTIVE | Noted: 2022-05-18

## 2022-08-10 PROBLEM — E55.9 VITAMIN D DEFICIENCY: Status: ACTIVE | Noted: 2022-08-10

## 2022-09-16 ENCOUNTER — HISTORICAL (OUTPATIENT)
Dept: ADMINISTRATIVE | Facility: HOSPITAL | Age: 32
End: 2022-09-16
Payer: COMMERCIAL

## 2022-09-17 ENCOUNTER — HISTORICAL (OUTPATIENT)
Dept: ADMINISTRATIVE | Facility: HOSPITAL | Age: 32
End: 2022-09-17
Payer: COMMERCIAL

## 2023-06-29 ENCOUNTER — OFFICE VISIT (OUTPATIENT)
Dept: URGENT CARE | Facility: CLINIC | Age: 33
End: 2023-06-29
Payer: COMMERCIAL

## 2023-06-29 VITALS
HEIGHT: 65 IN | TEMPERATURE: 99 F | OXYGEN SATURATION: 97 % | DIASTOLIC BLOOD PRESSURE: 79 MMHG | HEART RATE: 86 BPM | RESPIRATION RATE: 18 BRPM | WEIGHT: 202 LBS | SYSTOLIC BLOOD PRESSURE: 119 MMHG | BODY MASS INDEX: 33.66 KG/M2

## 2023-06-29 DIAGNOSIS — N91.2 AMENORRHEA: Primary | ICD-10-CM

## 2023-06-29 LAB — B-HCG FREE SERPL-ACNC: <2.42 MIU/ML

## 2023-06-29 PROCEDURE — 84702 CHORIONIC GONADOTROPIN TEST: CPT | Performed by: FAMILY MEDICINE

## 2023-06-29 PROCEDURE — 99213 PR OFFICE/OUTPT VISIT, EST, LEVL III, 20-29 MIN: ICD-10-PCS | Mod: ,,, | Performed by: FAMILY MEDICINE

## 2023-06-29 PROCEDURE — 99213 OFFICE O/P EST LOW 20 MIN: CPT | Mod: ,,, | Performed by: FAMILY MEDICINE

## 2023-06-29 PROCEDURE — 36415 COLL VENOUS BLD VENIPUNCTURE: CPT | Performed by: FAMILY MEDICINE

## 2023-06-29 NOTE — PATIENT INSTRUCTIONS
Plan:   Call you with the results of your blood work when it becomes available  Contact this clinic with any concerns

## 2023-06-29 NOTE — PROGRESS NOTES
"Subjective:      Patient ID: Jolly Saab is a 32 y.o. female.    Vitals:  height is 5' 5" (1.651 m) and weight is 91.6 kg (202 lb). Her temperature is 98.9 °F (37.2 °C). Her blood pressure is 119/79 and her pulse is 86. Her respiration is 18 and oxygen saturation is 97%.     Chief Complaint: late menstrual  (Late menstrual cycle/)     Patient is a 32 y.o. female who presents to urgent care with complaints of Late menstrual cycle.  Patient states she has about 15 days late.  Denies any recent stress.  However patient does know she did start practicing yoga for the past 2 weeks.  Patient had 2 negative HCG test at home.  Patient would like blood work done      Constitution: Negative.   HENT: Negative.     Cardiovascular: Negative.    Eyes: Negative.    Respiratory: Negative.     Gastrointestinal: Negative.    Genitourinary:  Positive for missed menses.   Musculoskeletal: Negative.    Skin: Negative.    Allergic/Immunologic: Negative.    Neurological: Negative.    Hematologic/Lymphatic: Negative.     Objective:     Physical Exam   Constitutional: She is oriented to person, place, and time.  Non-toxic appearance. She does not appear ill. No distress.   HENT:   Head: Normocephalic and atraumatic.   Eyes: Conjunctivae are normal.   Pulmonary/Chest: Effort normal.   Abdominal: Normal appearance.   Neurological: She is alert and oriented to person, place, and time.   Skin: Skin is not diaphoretic.   Psychiatric: Her behavior is normal. Mood, judgment and thought content normal.   Vitals reviewed.       Previous History      Review of patient's allergies indicates:   Allergen Reactions    Codeine      Other reaction(s): Intolerance       Past Medical History:   Diagnosis Date    ADHD     Anxiety disorder, unspecified      Current Outpatient Medications   Medication Instructions    EScitalopram oxalate (LEXAPRO) 20 mg, Oral, Daily    lisdexamfetamine (VYVANSE) 50 mg, Oral, Every morning     Past Surgical History: " "  Procedure Laterality Date    right knee repair x 3 (2008)       Family History   Problem Relation Age of Onset    Hypothyroidism Mother     Hypertension Father     No Known Problems Sister     Autism spectrum disorder Brother     Ovarian cancer Maternal Grandmother     Thyroid cancer Paternal Grandmother     Cancer Paternal Grandfather         Metastatic CA    No Known Problems Son        Social History     Tobacco Use    Smoking status: Never    Smokeless tobacco: Never   Substance Use Topics    Alcohol use: Yes     Alcohol/week: 1.0 - 2.0 standard drink     Types: 1 - 2 Cans of beer per week    Drug use: Never        Physical Exam      Vital Signs Reviewed   /79   Pulse 86   Temp 98.9 °F (37.2 °C)   Resp 18   Ht 5' 5" (1.651 m)   Wt 91.6 kg (202 lb)   LMP 05/16/2023   SpO2 97%   BMI 33.61 kg/m²        Procedures    Procedures     Labs     Results for orders placed or performed in visit on 09/17/22   POCT rapid strep A   Result Value Ref Range    Rapid Group A Strep Negative        Assessment:     1. Amenorrhea        Plan:   Call you with the results of your blood work when it becomes available  Contact this clinic with any concerns    Amenorrhea  -     HCG, Quantitative; Future; Expected date: 06/29/2023                    "